# Patient Record
(demographics unavailable — no encounter records)

---

## 2024-10-14 NOTE — PHYSICAL EXAM
[Normal] : Oriented to person, place, and time, insight and judgement were intact and the affect was normal [LE] : Sensory: Intact in bilateral lower extremities [Obese] : obese [de-identified] : no new xrays necessary for  today's appointment

## 2024-10-14 NOTE — DISCUSSION/SUMMARY
[Medication Risks Reviewed] : Medication risks reviewed [Surgical risks reviewed] : Surgical risks reviewed [Other: ____] : in [unfilled] [de-identified] : Physical Therapy prescription renewed since patient did not utilize the last prescription provided, patient hesitant to start with the pain she was experiencing at that time. Discussion of Tylenol and NSAIDs discussed with patient.  I discussed the underlying pathophysiology of the patient's condition. I expressed to the patient that based on her previous studies....  The patient reported that she has seen progression to his symptoms with movement, walking. I advised that the patient should continue to attend physical therapy if she has found some alleviation to her symptoms. Activity modifications were reviewed with the patient. If the patient begins to experience any changes or severe exacerbation of his symptoms, he should reach out to me as soon as possible. [Otherwise, he should return in 2 Months.]  I, Dr. Topher Borrero, personally performed the evaluation and management (E/M) services for this new patient.  That E/M includes conducting the initial examination, assessing all conditions, and establishing a plan of care.  Today, my ACP, Mary Ellen Marte, was here to observe my evaluation and management services for this patient to be followed going forward.

## 2024-10-14 NOTE — HISTORY OF PRESENT ILLNESS
[Pain Location] : pain [Lumbar] : lumbar region [Improving] : improving [___ mths] : [unfilled] month(s) ago [2] : a current pain level of 2/10 [Acetaminophen] : relieved by acetaminophen [NSAIDs] : relieved by nonsteroidal anti-inflammatory drugs [Rest] : relieved by rest [de-identified] :  Patients presents for a follow up visit for her low back pain. Patient states that she was unable to attend PT secondary to the amount of pain she had and has been taking Advil and Tylenol to help with her pain.  Patient states that there is an improvement of her low back pain in comparison to the last time she was here on September 4, 2024.

## 2025-01-07 NOTE — HISTORY OF PRESENT ILLNESS
[de-identified] : seen today for CPE  last visit 7/2023 for preop Last CPE 7/2022   was very sick in Aug after visit to [place] visiting daughter - bad bodyace with 102 fevers -did Teledoc - was told viral   !/2024 had gall bladder removal for gall stones   Surgical excision Right breast mass  Date of Procedure: 7/13/23    lower back pain 8/2024 - saw spine specialist 9/2024 - gave cortisone shot - recommended PT DX CHRISTIANNE   works in school as  - also working at [employer] in summer , also caring for her mother who is moving from a brother house to assisted living - very stresses and anxcious requesting renewal for Xanax  -daughter is in [place]   2 sisters had DM - both diet of complications from DM one sister had Uterine cancer   Anxiety-  -No SI. or HI -wants refill on xanax takes it as needed   History of hypothyroidism.  --had been following up with endocrinologist  -on levothyroxine same dose no change   High cholesterol  - off statin, self discontinued , no myalgia

## 2025-01-07 NOTE — HEALTH RISK ASSESSMENT
[Good] : ~his/her~  mood as  good [No falls in past year] : Patient reported no falls in the past year [0] : 2) Feeling down, depressed, or hopeless: Not at all (0) [PHQ-2 Negative - No further assessment needed] : PHQ-2 Negative - No further assessment needed [Never] : Never [Alone] : lives alone [Employed] : employed [Single] : single [de-identified] : walking  [[id number]] : 0 [Reports changes in hearing] : Reports no changes in hearing [Reports changes in vision] : Reports no changes in vision [Reports changes in dental health] : Reports no changes in dental health

## 2025-01-07 NOTE — ASSESSMENT
[FreeTextEntry1] : [facility] spine - s/p cortisone shot 9/2024  -followed by ortho  -recommended PT pending   s/p cholecystectomy for gall stones 1/2024  ct scan abd pelvis at that time showed large uterus with endometrial complex - was told by er to see gyn has not seen one yet - she does has a gyn in community verbalized to schedule appointment - referral placed fhx sister had uterine cancer - educated pt her risk of uterine cancer   Incidental finding on imaging as per pt ?  - Lung nodule- was adv to see pulm - pending -referral placed   Abnormal Mammo- 3/2023 - interval increase in RT breast mass -s/p surgical excision 7/13/23  -hx right 10:00 US core biopsy in 2016 with benign pathology. Subsequent imaging notable for an increase in the size of the biopsied right breast mass.  -2021 Jan-rt breast nodule 1/2021 - did not go to see breast surgeon  History of hypothyroidism -On levothyroxine 125. Check TSH  Anxiety-- due to family condition -rx alprazolam 0.5 as needed   High cholesterol ldl- 218 2022 - not on medications- get lipoprotein A - check lipid panel  Morbid obesity- BMI -46 --> 44 - discussed caloric control , portion control , weight loss, increase physical activity  Health maintenance flu vaccine- refused Tetanus vaccine- 2014 Mammogram- 6/2023 s/p lumpectomy rt breast 7/2024 path Intraductal papilloma sclerosisng adenosis and papillary apocrine metaplasia - due ordered  Colonoscopy- due, will make apt - educated risk of colon cancer. FIT 2018 neg , Patient verbalizes and understands.to see gastro skin check -referral to see dermatologist given Pap smear- 7/2019 neg. gyn 1/2021 and 3/2021- gyn advised   RTC 3 months adv to schedule appt before she leaved office today -for continuity of care

## 2025-01-07 NOTE — HISTORY OF PRESENT ILLNESS
[de-identified] : seen today for CPE  last visit 7/2023 for preop Last CPE 7/2022   was very sick in Aug after visit to [place] visiting daughter - bad bodyace with 102 fevers -did Teledoc - was told viral   !/2024 had gall bladder removal for gall stones   Surgical excision Right breast mass  Date of Procedure: 7/13/23    lower back pain 8/2024 - saw spine specialist 9/2024 - gave cortisone shot - recommended PT DX CHRISTIANNE   works in school as  - also working at [employer] in summer , also caring for her mother who is moving from a brother house to assisted living - very stresses and anxcious requesting renewal for Xanax  -daughter is in [place]   2 sisters had DM - both diet of complications from DM one sister had Uterine cancer   Anxiety-  -No SI. or HI -wants refill on xanax takes it as needed   History of hypothyroidism.  --had been following up with endocrinologist  -on levothyroxine same dose no change   High cholesterol  - off statin, self discontinued , no myalgia

## 2025-01-07 NOTE — HEALTH RISK ASSESSMENT
[Good] : ~his/her~  mood as  good [No falls in past year] : Patient reported no falls in the past year [0] : 2) Feeling down, depressed, or hopeless: Not at all (0) [PHQ-2 Negative - No further assessment needed] : PHQ-2 Negative - No further assessment needed [Never] : Never [Alone] : lives alone [Employed] : employed [Single] : single [de-identified] : walking  [[id number]] : 0 [Reports changes in hearing] : Reports no changes in hearing [Reports changes in vision] : Reports no changes in vision [Reports changes in dental health] : Reports no changes in dental health

## 2025-01-07 NOTE — PHYSICAL EXAM
[No Acute Distress] : no acute distress [Well-Appearing] : well-appearing [Normal Sclera/Conjunctiva] : normal sclera/conjunctiva [PERRL] : pupils equal round and reactive to light [EOMI] : extraocular movements intact [Normal Outer Ear/Nose] : the outer ears and nose were normal in appearance [Normal Oropharynx] : the oropharynx was normal [No JVD] : no jugular venous distention [No Lymphadenopathy] : no lymphadenopathy [Supple] : supple [Thyroid Normal, No Nodules] : the thyroid was normal and there were no nodules present [No Respiratory Distress] : no respiratory distress  [No Accessory Muscle Use] : no accessory muscle use [Clear to Auscultation] : lungs were clear to auscultation bilaterally [Normal Rate] : normal rate  [Regular Rhythm] : with a regular rhythm [Normal S1, S2] : normal S1 and S2 [No Murmur] : no murmur heard [No Carotid Bruits] : no carotid bruits [Pedal Pulses Present] : the pedal pulses are present [No Edema] : there was no peripheral edema [No Extremity Clubbing/Cyanosis] : no extremity clubbing/cyanosis [Soft] : abdomen soft [Non Tender] : non-tender [Non-distended] : non-distended [No Masses] : no abdominal mass palpated [No HSM] : no HSM [Normal Bowel Sounds] : normal bowel sounds [Normal Posterior Cervical Nodes] : no posterior cervical lymphadenopathy [Normal Anterior Cervical Nodes] : no anterior cervical lymphadenopathy [No CVA Tenderness] : no CVA  tenderness [No Spinal Tenderness] : no spinal tenderness [No Joint Swelling] : no joint swelling [Grossly Normal Strength/Tone] : grossly normal strength/tone [No Rash] : no rash [Coordination Grossly Intact] : coordination grossly intact [No Focal Deficits] : no focal deficits [Normal Gait] : normal gait [Deep Tendon Reflexes (DTR)] : deep tendon reflexes were 2+ and symmetric [Normal Affect] : the affect was normal [Normal Insight/Judgement] : insight and judgment were intact

## 2025-01-29 NOTE — DISCUSSION/SUMMARY
[PRN] : PRN [de-identified] :  I have discussed the underlying pathophysiology of the patient's condition.  Activity modifications were reviewed with the patient at length. I highly recommend that the patient starts a course of physical therapy at this time. I have provided the patient with a detailed prescription for physical therapy. The patient should follow up with me as needed.

## 2025-01-29 NOTE — REASON FOR VISIT
[Follow-Up Visit] : a follow-up visit for [FreeTextEntry2] : Degenerative arthritis of lumbar spine

## 2025-01-29 NOTE — HISTORY OF PRESENT ILLNESS
[Pain Location] : pain [Lumbar] : lumbar region [Stable] : stable [0] : a current pain level of 0/10 [de-identified] :  Patients presents for a follow up visit for Degenerative arthritis of lumbar spine. Patient states that her lower back pain has improved and notes that she continues to have stiffness. She states that she was unable to attend physical therapy. She notes that she occasionally experiences pain symptoms with prolonged standing.  Patient currently takes Aleve to help manage her symptoms.

## 2025-01-29 NOTE — PHYSICAL EXAM
[LE] : Sensory: Intact in bilateral lower extremities [Obese] : obese [Normal] : Oriented to person, place, and time, insight and judgement were intact and the affect was normal

## 2025-01-29 NOTE — ADDENDUM
[FreeTextEntry1] :  I, Heaven Kerrd, acted solely as a scribe for Dr. Topher Borrero on this date 01/29/2025 .  All medical records entries made by the Scribe were at my Dr. Topher Borrero direction and personally dictated by me on 01/29/2025. I have reviewed the chart and agree that the record accurately reflects my personal performance of the history, physical exam, assessment and plan. I have also personally directed, reviewed, and agreed with the chart.

## 2025-05-13 NOTE — ASSESSMENT
[FreeTextEntry1] : URTI sinus congestion  - rx for flonse send BOD  ENt eval   FIT + referral to see GI given again   [facility] spine - s/p cortisone shot 9/2024  -followed by ortho  -recommended PT pending   s/p cholecystectomy for gall stones 1/2024  ct scan abd pelvis at that time showed large uterus with endometrial complex - was told by er to see gyn has not seen one yet - she does has a gyn in community verbalized to schedule appointment - referral placed fhx sister had uterine cancer - educated pt her risk of uterine cancer   Incidental finding on imaging as per pt ?  - Lung nodule- was adv to see pulm - pending -referral placed   Abnormal Mammo- 3/2023 - interval increase in RT breast mass -s/p surgical excision 7/13/23  -hx right 10:00 US core biopsy in 2016 with benign pathology. Subsequent imaging notable for an increase in the size of the biopsied right breast mass.  -2021 Jan-rt breast nodule 1/2021 - did not go to see breast surgeon  History of hypothyroidism -On levothyroxine 125. Check TSH  Anxiety-- due to family condition -rx alprazolam 0.5 as needed   .Hyperlipidemia-very high cholesterol  1/25 - never start crestor 20 qd LPA neg 1/25 - discussed agin exects of uncontrolled Chol stroke , MI retinopathy and nephropathy  -check lipid panel and LFT today -Avoid animal fat , red meat cheese , butter , red meat , start exercise daily 30 minutes , loose weight  Morbid obesity- BMI -46 --> 44 - discussed caloric control , portion control , weight loss, increase physical activity  Health maintenance flu vaccine- refused Tetanus vaccine- 2014 Mammogram- 6/2023 s/p lumpectomy rt breast 7/2024 path Intraductal papilloma sclerosisng adenosis and papillary apocrine metaplasia - due ordered ordered again  Colonoscopy- due, will make apt - educated risk of colon cancer. FIT 1/2025 Positive  , Patient verbalizes and understands.to see gastro skin check -referral to see dermatologist given Pap smear- 7/2019 neg. gyn 1/2021 and 3/2021- gyn advised   RTC 3 months adv to schedule appt before she leaved office today -for continuity of care

## 2025-05-13 NOTE — HISTORY OF PRESENT ILLNESS
[de-identified] : seen today for f/u on ch issues   was sick few weeks ago could not get in to do her Health maintenance  needs referral for ENT and PULM  cannot breath from nose feels swollen - lost voice -using Vics nasal spray  !/2024 had gall bladder removal for gall stones   Surgical excision Right breast mass  Date of Procedure: 7/13/23    lower back pain 8/2024 - saw spine specialist 9/2024 - gave cortisone shot - recommended PT DX JOHND   works in school as  - also working at [employer] in summer , also caring for her mother who is moving from a brother house to assisted living - very stresses and anxcious requesting renewal for Xanax  -daughter is in [place]   2 sisters had DM - both diet of complications from DM one sister had Uterine cancer   Anxiety-  -No SI. or HI -wants refill on xanax takes it as needed   History of hypothyroidism.  --had been following up with endocrinologist  -on levothyroxine same dose no change   High cholesterol  - off statin, self discontinued , no myalgia